# Patient Record
Sex: FEMALE | Race: WHITE | HISPANIC OR LATINO | Employment: FULL TIME | URBAN - METROPOLITAN AREA
[De-identification: names, ages, dates, MRNs, and addresses within clinical notes are randomized per-mention and may not be internally consistent; named-entity substitution may affect disease eponyms.]

---

## 2021-10-15 ENCOUNTER — OFFICE VISIT (OUTPATIENT)
Dept: FAMILY MEDICINE CLINIC | Facility: CLINIC | Age: 26
End: 2021-10-15

## 2021-10-15 ENCOUNTER — APPOINTMENT (OUTPATIENT)
Dept: LAB | Facility: CLINIC | Age: 26
End: 2021-10-15
Payer: COMMERCIAL

## 2021-10-15 VITALS
RESPIRATION RATE: 18 BRPM | HEART RATE: 82 BPM | DIASTOLIC BLOOD PRESSURE: 80 MMHG | OXYGEN SATURATION: 97 % | SYSTOLIC BLOOD PRESSURE: 104 MMHG | BODY MASS INDEX: 28.6 KG/M2 | TEMPERATURE: 98 F | HEIGHT: 64 IN | WEIGHT: 167.5 LBS

## 2021-10-15 DIAGNOSIS — Z83.3 FAMILY HISTORY OF DIABETES MELLITUS: ICD-10-CM

## 2021-10-15 DIAGNOSIS — J01.10 ACUTE NON-RECURRENT FRONTAL SINUSITIS: Primary | ICD-10-CM

## 2021-10-15 DIAGNOSIS — Z11.4 SCREENING FOR HIV (HUMAN IMMUNODEFICIENCY VIRUS): ICD-10-CM

## 2021-10-15 DIAGNOSIS — E66.3 OVERWEIGHT (BMI 25.0-29.9): ICD-10-CM

## 2021-10-15 DIAGNOSIS — Z11.59 NEED FOR HEPATITIS C SCREENING TEST: ICD-10-CM

## 2021-10-15 DIAGNOSIS — Z00.00 ANNUAL PHYSICAL EXAM: ICD-10-CM

## 2021-10-15 DIAGNOSIS — Z23 ENCOUNTER FOR IMMUNIZATION: ICD-10-CM

## 2021-10-15 DIAGNOSIS — Z12.4 SCREENING FOR CERVICAL CANCER: ICD-10-CM

## 2021-10-15 LAB
ALBUMIN SERPL BCP-MCNC: 4.1 G/DL (ref 3.5–5)
ALP SERPL-CCNC: 93 U/L (ref 46–116)
ALT SERPL W P-5'-P-CCNC: 35 U/L (ref 12–78)
ANION GAP SERPL CALCULATED.3IONS-SCNC: 3 MMOL/L (ref 4–13)
AST SERPL W P-5'-P-CCNC: 22 U/L (ref 5–45)
BASOPHILS # BLD AUTO: 0.07 THOUSANDS/ΜL (ref 0–0.1)
BASOPHILS NFR BLD AUTO: 1 % (ref 0–1)
BILIRUB SERPL-MCNC: 0.61 MG/DL (ref 0.2–1)
BUN SERPL-MCNC: 13 MG/DL (ref 5–25)
CALCIUM SERPL-MCNC: 9.4 MG/DL (ref 8.3–10.1)
CHLORIDE SERPL-SCNC: 110 MMOL/L (ref 100–108)
CHOLEST SERPL-MCNC: 152 MG/DL (ref 50–200)
CO2 SERPL-SCNC: 27 MMOL/L (ref 21–32)
CREAT SERPL-MCNC: 0.74 MG/DL (ref 0.6–1.3)
CREAT UR-MCNC: 275 MG/DL
EOSINOPHIL # BLD AUTO: 0.4 THOUSAND/ΜL (ref 0–0.61)
EOSINOPHIL NFR BLD AUTO: 7 % (ref 0–6)
ERYTHROCYTE [DISTWIDTH] IN BLOOD BY AUTOMATED COUNT: 12.4 % (ref 11.6–15.1)
GFR SERPL CREATININE-BSD FRML MDRD: 111 ML/MIN/1.73SQ M
GLUCOSE P FAST SERPL-MCNC: 90 MG/DL (ref 65–99)
HCT VFR BLD AUTO: 41.3 % (ref 34.8–46.1)
HCV AB SER QL: NORMAL
HDLC SERPL-MCNC: 41 MG/DL
HGB BLD-MCNC: 13.7 G/DL (ref 11.5–15.4)
IMM GRANULOCYTES # BLD AUTO: 0.02 THOUSAND/UL (ref 0–0.2)
IMM GRANULOCYTES NFR BLD AUTO: 0 % (ref 0–2)
LDLC SERPL CALC-MCNC: 101 MG/DL (ref 0–100)
LYMPHOCYTES # BLD AUTO: 2.55 THOUSANDS/ΜL (ref 0.6–4.47)
LYMPHOCYTES NFR BLD AUTO: 44 % (ref 14–44)
MCH RBC QN AUTO: 29.5 PG (ref 26.8–34.3)
MCHC RBC AUTO-ENTMCNC: 33.2 G/DL (ref 31.4–37.4)
MCV RBC AUTO: 89 FL (ref 82–98)
MICROALBUMIN UR-MCNC: 25.6 MG/L (ref 0–20)
MICROALBUMIN/CREAT 24H UR: 9 MG/G CREATININE (ref 0–30)
MONOCYTES # BLD AUTO: 0.29 THOUSAND/ΜL (ref 0.17–1.22)
MONOCYTES NFR BLD AUTO: 5 % (ref 4–12)
NEUTROPHILS # BLD AUTO: 2.5 THOUSANDS/ΜL (ref 1.85–7.62)
NEUTS SEG NFR BLD AUTO: 43 % (ref 43–75)
NONHDLC SERPL-MCNC: 111 MG/DL
NRBC BLD AUTO-RTO: 0 /100 WBCS
PLATELET # BLD AUTO: 341 THOUSANDS/UL (ref 149–390)
PMV BLD AUTO: 9.5 FL (ref 8.9–12.7)
POTASSIUM SERPL-SCNC: 4.1 MMOL/L (ref 3.5–5.3)
PROT SERPL-MCNC: 8.1 G/DL (ref 6.4–8.2)
RBC # BLD AUTO: 4.65 MILLION/UL (ref 3.81–5.12)
SODIUM SERPL-SCNC: 140 MMOL/L (ref 136–145)
TRIGL SERPL-MCNC: 50 MG/DL
TSH SERPL DL<=0.05 MIU/L-ACNC: 2.29 UIU/ML (ref 0.36–3.74)
WBC # BLD AUTO: 5.83 THOUSAND/UL (ref 4.31–10.16)

## 2021-10-15 PROCEDURE — 90471 IMMUNIZATION ADMIN: CPT | Performed by: FAMILY MEDICINE

## 2021-10-15 PROCEDURE — 86803 HEPATITIS C AB TEST: CPT

## 2021-10-15 PROCEDURE — 90686 IIV4 VACC NO PRSV 0.5 ML IM: CPT | Performed by: FAMILY MEDICINE

## 2021-10-15 PROCEDURE — 99385 PREV VISIT NEW AGE 18-39: CPT | Performed by: FAMILY MEDICINE

## 2021-10-15 PROCEDURE — 87389 HIV-1 AG W/HIV-1&-2 AB AG IA: CPT

## 2021-10-15 PROCEDURE — 36415 COLL VENOUS BLD VENIPUNCTURE: CPT

## 2021-10-15 PROCEDURE — 80053 COMPREHEN METABOLIC PANEL: CPT

## 2021-10-15 PROCEDURE — 80061 LIPID PANEL: CPT

## 2021-10-15 PROCEDURE — 82570 ASSAY OF URINE CREATININE: CPT

## 2021-10-15 PROCEDURE — 85025 COMPLETE CBC W/AUTO DIFF WBC: CPT

## 2021-10-15 PROCEDURE — 82043 UR ALBUMIN QUANTITATIVE: CPT

## 2021-10-15 PROCEDURE — 84443 ASSAY THYROID STIM HORMONE: CPT

## 2021-10-15 RX ORDER — FLUTICASONE PROPIONATE 50 MCG
1 SPRAY, SUSPENSION (ML) NASAL 2 TIMES DAILY
Qty: 16 G | Refills: 1 | Status: SHIPPED | OUTPATIENT
Start: 2021-10-15

## 2021-10-15 RX ORDER — AMOXICILLIN 500 MG/1
500 CAPSULE ORAL EVERY 8 HOURS SCHEDULED
Qty: 30 CAPSULE | Refills: 0 | Status: SHIPPED | OUTPATIENT
Start: 2021-10-15 | End: 2021-10-25

## 2021-10-20 ENCOUNTER — OFFICE VISIT (OUTPATIENT)
Dept: OBGYN CLINIC | Facility: CLINIC | Age: 26
End: 2021-10-20

## 2021-10-20 VITALS
DIASTOLIC BLOOD PRESSURE: 79 MMHG | BODY MASS INDEX: 29.17 KG/M2 | SYSTOLIC BLOOD PRESSURE: 118 MMHG | WEIGHT: 168.6 LBS | HEART RATE: 74 BPM

## 2021-10-20 DIAGNOSIS — Z97.5 IUD (INTRAUTERINE DEVICE) IN PLACE: Primary | ICD-10-CM

## 2021-10-20 DIAGNOSIS — Z12.4 SCREENING FOR CERVICAL CANCER: ICD-10-CM

## 2021-10-20 LAB — HIV 1+2 AB+HIV1 P24 AG SERPL QL IA: NORMAL

## 2021-10-20 PROCEDURE — 99213 OFFICE O/P EST LOW 20 MIN: CPT | Performed by: OBSTETRICS & GYNECOLOGY

## 2022-04-28 ENCOUNTER — OFFICE VISIT (OUTPATIENT)
Dept: OBGYN CLINIC | Facility: CLINIC | Age: 27
End: 2022-04-28

## 2022-04-28 VITALS
WEIGHT: 164 LBS | HEART RATE: 89 BPM | SYSTOLIC BLOOD PRESSURE: 117 MMHG | DIASTOLIC BLOOD PRESSURE: 77 MMHG | BODY MASS INDEX: 28.37 KG/M2

## 2022-04-28 DIAGNOSIS — Z30.431 IUD CHECK UP: Primary | ICD-10-CM

## 2022-04-28 PROCEDURE — 99213 OFFICE O/P EST LOW 20 MIN: CPT | Performed by: OBSTETRICS & GYNECOLOGY

## 2022-04-28 NOTE — PROGRESS NOTES
OB/GYN VISIT  Rashad Alvarado  4/29/2022  4:22 PM      Assessment/Plan:    Rashad Alvarado is a 31 y o  who presents with milk bleb vs carbuncle  Patient reporting that the lesion is improving spontaneously and there is no evidence of infection at this time  She continues to pump without issue     - Recommend warm compress to area, patient to RTC if lesion worsens  - IUD strings noted in the cervix, however given new onset pain, will order pelvic ultrasound    Subjective:     Rashad Alvarado is a 31 y o  who presents with breast pain and increasing pelvic pain with bloating  She reports that she is breast feeding and reports a lesion on her breast that has been present for approximately 1 week  The pain was so significant last week that she stopped putting her baby to the breast but she has been able to continue pumping  The pain is getting better at this time  She denies fevers/chills  She denies any nipple discharge  Patient also reports that she has been having more bloating, cramping and pelvic pain since before  She has an IUD in place but wants to have a pelvic exam to check it  Objective:    Vitals: Blood pressure 117/77, pulse 89, weight 74 4 kg (164 lb)  Body mass index is 28 37 kg/m²  Past Medical History:   Diagnosis Date    Constipation     History of sinus problem      Past Surgical History:   Procedure Laterality Date    GALLBLADDER SURGERY         Physical Exam  Constitutional:       General: She is not in acute distress  Appearance: She is not ill-appearing or toxic-appearing  HENT:      Head: Normocephalic and atraumatic  Eyes:      Extraocular Movements: Extraocular movements intact  Pupils: Pupils are equal, round, and reactive to light  Chest:          Comments: Small, sub-centimeter lesions noted; no purulent discharge expressed; no erythema or warmth noted  Abdominal:      Palpations: Abdomen is soft  Genitourinary:     General: Normal vulva        Comments: IUD strings visualized through the cervical os  Neurological:      Mental Status: She is alert             Ayan Dennis MD  4/29/2022  4:22 PM

## 2022-05-02 ENCOUNTER — HOSPITAL ENCOUNTER (OUTPATIENT)
Dept: ULTRASOUND IMAGING | Facility: HOSPITAL | Age: 27
Discharge: HOME | End: 2022-05-02
Payer: COMMERCIAL

## 2022-05-02 DIAGNOSIS — Z30.431 IUD CHECK UP: ICD-10-CM

## 2022-05-02 PROCEDURE — 76830 TRANSVAGINAL US NON-OB: CPT

## 2022-05-02 PROCEDURE — 76856 US EXAM PELVIC COMPLETE: CPT

## 2022-05-10 ENCOUNTER — TELEPHONE (OUTPATIENT)
Dept: OBGYN CLINIC | Facility: CLINIC | Age: 27
End: 2022-05-10

## 2022-05-10 NOTE — TELEPHONE ENCOUNTER
Called patient to notified her that IUD is positioned correctly on the ultrasound, lvm asking patient to call office

## 2022-07-21 ENCOUNTER — APPOINTMENT (OUTPATIENT)
Dept: LAB | Facility: HOSPITAL | Age: 27
End: 2022-07-21
Payer: COMMERCIAL

## 2022-07-21 ENCOUNTER — ANNUAL EXAM (OUTPATIENT)
Dept: OBGYN CLINIC | Facility: CLINIC | Age: 27
End: 2022-07-21

## 2022-07-21 VITALS
SYSTOLIC BLOOD PRESSURE: 117 MMHG | BODY MASS INDEX: 28.03 KG/M2 | DIASTOLIC BLOOD PRESSURE: 77 MMHG | HEART RATE: 76 BPM | WEIGHT: 164.2 LBS | HEIGHT: 64 IN

## 2022-07-21 DIAGNOSIS — Z97.5 IUD (INTRAUTERINE DEVICE) IN PLACE: ICD-10-CM

## 2022-07-21 DIAGNOSIS — Z87.42 HISTORY OF ABNORMAL CERVICAL PAP SMEAR: ICD-10-CM

## 2022-07-21 DIAGNOSIS — Z98.890 H/O LEEP: ICD-10-CM

## 2022-07-21 DIAGNOSIS — Z72.51 HIGH RISK HETEROSEXUAL BEHAVIOR: ICD-10-CM

## 2022-07-21 DIAGNOSIS — Z01.419 ENCOUNTER FOR ANNUAL ROUTINE GYNECOLOGICAL EXAMINATION: Primary | ICD-10-CM

## 2022-07-21 LAB
HBV SURFACE AG SER QL: NORMAL
HCV AB SER QL: NORMAL

## 2022-07-21 PROCEDURE — 86803 HEPATITIS C AB TEST: CPT

## 2022-07-21 PROCEDURE — 87491 CHLMYD TRACH DNA AMP PROBE: CPT

## 2022-07-21 PROCEDURE — G0476 HPV COMBO ASSAY CA SCREEN: HCPCS

## 2022-07-21 PROCEDURE — 87591 N.GONORRHOEAE DNA AMP PROB: CPT

## 2022-07-21 PROCEDURE — 87340 HEPATITIS B SURFACE AG IA: CPT

## 2022-07-21 PROCEDURE — G0145 SCR C/V CYTO,THINLAYER,RESCR: HCPCS

## 2022-07-21 PROCEDURE — 99214 OFFICE O/P EST MOD 30 MIN: CPT | Performed by: OBSTETRICS & GYNECOLOGY

## 2022-07-21 PROCEDURE — 36415 COLL VENOUS BLD VENIPUNCTURE: CPT

## 2022-07-21 PROCEDURE — 86592 SYPHILIS TEST NON-TREP QUAL: CPT

## 2022-07-21 PROCEDURE — 87389 HIV-1 AG W/HIV-1&-2 AB AG IA: CPT

## 2022-07-21 NOTE — PROGRESS NOTES
OB/GYN VISIT  Shell Prather  7/21/2022  5:04 PM    Subjective    [facility] Jose Officer is a 28 y o  female who presents for annual well woman exam   Last Pap smear was normal a year ago  Patient states that she had a LEEP procedure done due to abnormal pap 4 years ago  1 year after that she had a normal pap smear, and then last year one more normal pap smear  Patient does not have access to medical records  Patient no longer has periods because she has mirena in place, and has no spotting  Patient requests STI screening  Menstrual History:  OB History    No obstetric history on file  Review of Systems  Denies vaginal discharge, labial erythema or lesions, dyspareunia  Sexually active: yes - with 1 male partner  Current contraception: IUD  History of abnormal Pap smear: yes - 4 years ago, had a LEEP  Family history of breast, endometrial, uterine or ovarian cancer: no  Gardasil vaccine: yes    COVID Vaccine: yes    Health Maintenance:    She feels safe at home  She does not use tobacco  She does not use other drugs     Objective:  /77   Pulse 76   Ht 5' 4" (1 626 m)   Wt 74 5 kg (164 lb 3 2 oz)   BMI 28 18 kg/m²  Body mass index is 28 18 kg/m²  Physical Exam:  GEN: The patient was alert and oriented x3, pleasant well-appearing female in no acute distress  HEENT:  Unremarkable, no anterior or posterior lymphadenopathy, no thyromegaly  CV:  Regular rate   RESP:  Unlabored breathing  BREAST:  Symmetric breasts with no palpable breast masses or obvious breast lesions  She has no retractions or nipple discharge  She has no axillary abnormalities or palpable masses  GI:  Soft, nontender, non-distended  MSK: bilateral lower extremities are nontender, no edema  : Normal appearing external female genitalia, normal appearing urethral meatus  On sterile speculum exam,  normal appearing vaginal epithelium, no vaginal discharge, no bleeding, grossly normal appearing cervix   On bimanual exam,  no cervical motion tenderness; uterus is smooth, mobile, nontender  No tenderness or fullness in the bilateral adnexa  ASSESSMENT/PLAN: Fern Blevinst is a 28 y o  No obstetric history on file  who presents for annual gynecologic exam     21-29  1  Routine well woman exam done today  2   Pap and HPV:Pap with HPV was done today  Current ASCCP Guidelines reviewed  3   The patient accepted STD testing  chlamydia, gonorrhea, HIV and HPV testing performed  Safe sex practices have been discussed  4  The patient is sexually active  She has mirena and options have been discussed  5  The following were reviewed in today's visit: STD testing and HIV risk factors and prevention    6  Patient to return to office in 12 months for annual exam      D/w Dr Paloma Mann MD  PGY-1

## 2022-07-22 LAB
C TRACH DNA SPEC QL NAA+PROBE: NEGATIVE
HIV 1+2 AB+HIV1 P24 AG SERPL QL IA: NORMAL
HPV HR 12 DNA CVX QL NAA+PROBE: NEGATIVE
HPV16 DNA CVX QL NAA+PROBE: NEGATIVE
HPV18 DNA CVX QL NAA+PROBE: NEGATIVE
N GONORRHOEA DNA SPEC QL NAA+PROBE: NEGATIVE
RPR SER QL: NORMAL

## 2022-07-27 LAB
LAB AP GYN PRIMARY INTERPRETATION: NORMAL
Lab: NORMAL
PATH INTERP SPEC-IMP: NORMAL

## 2022-10-12 PROBLEM — J01.10 ACUTE NON-RECURRENT FRONTAL SINUSITIS: Status: RESOLVED | Noted: 2021-10-15 | Resolved: 2022-10-12

## 2022-12-28 ENCOUNTER — OFFICE VISIT (OUTPATIENT)
Dept: FAMILY MEDICINE CLINIC | Facility: CLINIC | Age: 27
End: 2022-12-28

## 2022-12-28 VITALS
HEART RATE: 93 BPM | DIASTOLIC BLOOD PRESSURE: 70 MMHG | BODY MASS INDEX: 29.19 KG/M2 | HEIGHT: 64 IN | OXYGEN SATURATION: 98 % | TEMPERATURE: 97.8 F | SYSTOLIC BLOOD PRESSURE: 118 MMHG | WEIGHT: 171 LBS | RESPIRATION RATE: 16 BRPM

## 2022-12-28 DIAGNOSIS — R23.4 CRACKED SKIN ON FEET: Primary | ICD-10-CM

## 2022-12-28 DIAGNOSIS — L84 CALLUS OF FOOT: ICD-10-CM

## 2022-12-28 RX ORDER — AMMONIUM LACTATE 12 G/100G
CREAM TOPICAL 2 TIMES DAILY
Qty: 385 G | Refills: 0 | Status: SHIPPED | OUTPATIENT
Start: 2022-12-28 | End: 2023-01-10

## 2022-12-28 NOTE — PROGRESS NOTES
Name: Essence Cortez      : 1995      MRN: [id number]  Encounter Provider: TAISHA Lau  Encounter Date: 2022   Encounter department: [facility]     1  Cracked skin on feet  Assessment & Plan:  Severely calloused, dry, cracked skin on feet bilaterally without signs of infection  - Ammonium lactate cream BID  - Referral to Podiatry Dr Chandana Sauceda  Orders:  -     Ambulatory Referral to Podiatry; Future  -     ammonium lactate (LAC-HYDRIN) 12 % cream; Apply topically 2 (two) times a day    2  Callus of foot  -     Ambulatory Referral to Podiatry; Future  -     ammonium lactate (LAC-HYDRIN) 12 % cream; Apply topically 2 (two) times a day         Subjective     HPI     Dayri presented to the office for a SAME DAY appt for pain in both feet x2 months due to cracking at the heels and calloused soles  Pt denies any open areas or bleeding, denies any other symptoms  Pt states this is a new problem  She does not have skin problems elsewhere on her body  Review of Systems   Constitutional: Negative for chills, fatigue, fever and unexpected weight change  Respiratory: Negative  Cardiovascular: Negative  Negative for leg swelling  Gastrointestinal: Negative  Musculoskeletal: Positive for gait problem (Pain with walking  )  All other systems reviewed and are negative        Past Medical History:   Diagnosis Date   • Constipation    • History of sinus problem      Past Surgical History:   Procedure Laterality Date   • GALLBLADDER SURGERY       Family History   Problem Relation Age of Onset   • Hypertension Mother    • No Known Problems Father      Social History     Socioeconomic History   • Marital status: /Civil Union     Spouse name: None   • Number of children: None   • Years of education: None   • Highest education level: None   Occupational History   • None   Tobacco Use   • Smoking status: Never   • Smokeless tobacco: Never   Substance and Sexual Activity   • Alcohol use: Never   • Drug use: Never   • Sexual activity: Yes     Partners: Male     Birth control/protection: I U D  Other Topics Concern   • None   Social History Narrative   • None     Social Determinants of Health     Financial Resource Strain: Low Risk    • Difficulty of Paying Living Expenses: Not hard at all   Food Insecurity: No Food Insecurity   • Worried About Running Out of Food in the Last Year: Never true   • Ran Out of Food in the Last Year: Never true   Transportation Needs: No Transportation Needs   • Lack of Transportation (Medical): No   • Lack of Transportation (Non-Medical): No   Physical Activity: Not on file   Stress: Not on file   Social Connections: Not on file   Intimate Partner Violence: Not on file   Housing Stability: Not on file     Current Outpatient Medications on File Prior to Visit   Medication Sig   • fluticasone (FLONASE) 50 mcg/act nasal spray 1 spray into each nostril 2 (two) times a day     No Known Allergies  Immunization History   Administered Date(s) Administered   • COVID-19 PFIZER VACCINE 0 3 ML IM 05/24/2021, 06/14/2021   • Influenza, injectable, quadrivalent, preservative free 0 5 mL 10/15/2021       Objective     /70 (BP Location: Right arm, Patient Position: Sitting, Cuff Size: Standard)   Pulse 93   Temp 97 8 °F (36 6 °C) (Temporal)   Resp 16   Ht 5' 4" (1 626 m)   Wt 77 6 kg (171 lb)   SpO2 98%   Breastfeeding No   BMI 29 35 kg/m²     Physical Exam  Vitals reviewed  Constitutional:       General: She is not in acute distress  Appearance: She is overweight  She is not ill-appearing or diaphoretic  Cardiovascular:      Rate and Rhythm: Normal rate and regular rhythm  Pulses:           Dorsalis pedis pulses are 2+ on the right side and 2+ on the left side  Posterior tibial pulses are 2+ on the right side and 2+ on the left side  Heart sounds: Normal heart sounds   No murmur heard   Pulmonary:      Effort: Pulmonary effort is normal  No tachypnea  Breath sounds: Normal breath sounds  No decreased breath sounds or wheezing  Musculoskeletal:      Right lower leg: No edema  Left lower leg: No edema  Right foot: Normal range of motion  No deformity or bunion  Left foot: Normal range of motion  No deformity or bunion  Feet:      Right foot:      Protective Sensation: 10 sites tested  10 sites sensed  Skin integrity: Callus, dry skin and fissure present  No ulcer, skin breakdown, erythema or warmth  Toenail Condition: Right toenails are normal       Left foot:      Protective Sensation: 10 sites tested  10 sites sensed  Skin integrity: Callus, dry skin and fissure present  No ulcer, skin breakdown, erythema or warmth  Toenail Condition: Left toenails are normal    Skin:     General: Skin is warm and dry  Neurological:      Mental Status: She is alert and oriented to person, place, and time     Psychiatric:         Mood and Affect: Mood and affect normal                  TAISHA Mendiola

## 2023-01-01 NOTE — ASSESSMENT & PLAN NOTE
Severely calloused, dry, cracked skin on feet bilaterally without signs of infection  - Ammonium lactate cream BID  - Referral to Podiatry Dr Jacqueline Hall

## 2023-01-10 ENCOUNTER — OFFICE VISIT (OUTPATIENT)
Dept: FAMILY MEDICINE CLINIC | Facility: CLINIC | Age: 28
End: 2023-01-10

## 2023-01-10 VITALS
BODY MASS INDEX: 29.02 KG/M2 | OXYGEN SATURATION: 98 % | DIASTOLIC BLOOD PRESSURE: 70 MMHG | HEIGHT: 64 IN | HEART RATE: 93 BPM | SYSTOLIC BLOOD PRESSURE: 130 MMHG | RESPIRATION RATE: 16 BRPM | TEMPERATURE: 98.7 F | WEIGHT: 170 LBS

## 2023-01-10 DIAGNOSIS — L30.9 DERMATITIS: Primary | ICD-10-CM

## 2023-01-10 PROBLEM — Z87.42 HISTORY OF ABNORMAL CERVICAL PAP SMEAR: Status: RESOLVED | Noted: 2022-07-21 | Resolved: 2023-01-10

## 2023-01-10 RX ORDER — DIAPER,BRIEF,INFANT-TODD,DISP
EACH MISCELLANEOUS 2 TIMES DAILY
Qty: 453.6 G | Refills: 0 | Status: SHIPPED | OUTPATIENT
Start: 2023-01-10

## 2023-01-10 NOTE — PROGRESS NOTES
Name: Prem Sorto      : 1995      MRN: [id number]  Encounter Provider: [address]  Encounter Date: 1/10/2023   Encounter department: [address] [place] [address]  Dermatitis  Assessment & Plan:  Discussed elimination of exacerbating factors, maintaining skin hydration with emollients & moisturizers, & bathing practices  Orders:  -     hydrocortisone 1 % cream; Apply topically 2 (two) times a day    BMI Counseling: There is no height or weight on file to calculate BMI  The BMI is above normal  Nutrition recommendations include decreasing portion sizes, encouraging healthy choices of fruits and vegetables, decreasing fast food intake, consuming healthier snacks, limiting drinks that contain sugar, moderation in carbohydrate intake, increasing intake of lean protein, reducing intake of saturated and trans fat and reducing intake of cholesterol  Exercise recommendations include moderate physical activity 150 minutes/week  No pharmacotherapy was ordered  Rationale for BMI follow-up plan is due to patient being overweight or obese  Subjective      Rash  Patient presents for evaluation of a rash involving the chest and back  Rash started 1 weeks ago  Lesions are red, and raised in texture  Rash has not changed over time  Rash is pruritic  Associated symptoms: none  Patient has not had contacts with similar rash  Patient has had new exposures  Rash occurred after trying a new dove soap            Review of Systems   Constitutional: Negative for chills and fever  HENT: Negative for ear pain and sore throat  Eyes: Negative for pain and visual disturbance  Respiratory: Negative for cough and shortness of breath  Cardiovascular: Negative for chest pain and palpitations  Gastrointestinal: Negative for abdominal pain and vomiting  Genitourinary: Negative for dysuria and hematuria     Musculoskeletal: Negative for arthralgias and back pain  Skin: Positive for rash  Negative for color change  Neurological: Negative for seizures and syncope  All other systems reviewed and are negative  Current Outpatient Medications on File Prior to Visit   Medication Sig   • [DISCONTINUED] ammonium lactate (LAC-HYDRIN) 12 % cream Apply topically 2 (two) times a day   • [DISCONTINUED] fluticasone (FLONASE) 50 mcg/act nasal spray 1 spray into each nostril 2 (two) times a day       Objective     /70 (BP Location: Right arm, Patient Position: Sitting, Cuff Size: Standard)   Pulse 93   Temp 98 7 °F (37 1 °C) (Temporal)   Resp 16   Ht 5' 4" (1 626 m)   Wt 77 1 kg (170 lb)   SpO2 98%   Breastfeeding No   BMI 29 18 kg/m²     Physical Exam  Vitals and nursing note reviewed  HENT:      Head: Normocephalic and atraumatic  Right Ear: External ear normal       Left Ear: External ear normal       Nose: Nose normal    Eyes:      Extraocular Movements: Extraocular movements intact  Conjunctiva/sclera: Conjunctivae normal       Pupils: Pupils are equal, round, and reactive to light  Cardiovascular:      Rate and Rhythm: Normal rate and regular rhythm  Pulses: Normal pulses  Pulmonary:      Effort: Pulmonary effort is normal    Abdominal:      General: Abdomen is flat  Palpations: Abdomen is soft  Tenderness: There is no abdominal tenderness  Musculoskeletal:         General: Normal range of motion  Cervical back: Normal range of motion  Skin:     General: Skin is warm and dry  Findings: Erythema and rash present  Rash is urticarial       Comments: Multiple small (< 1 cm) slightly crusted spots noted on upper chest and back  Neurological:      General: No focal deficit present  Mental Status: She is alert and oriented to person, place, and time  Mental status is at baseline  Psychiatric:         Mood and Affect: Mood normal          Behavior: Behavior normal          Thought Content:  Thought content normal          Judgment: Judgment normal        [address] [place] [address]

## 2023-01-10 NOTE — ASSESSMENT & PLAN NOTE
Discussed elimination of exacerbating factors, maintaining skin hydration with emollients & moisturizers, & bathing practices

## 2023-01-25 ENCOUNTER — OFFICE VISIT (OUTPATIENT)
Dept: FAMILY MEDICINE CLINIC | Facility: CLINIC | Age: 28
End: 2023-01-25

## 2023-01-25 VITALS
DIASTOLIC BLOOD PRESSURE: 70 MMHG | WEIGHT: 166 LBS | TEMPERATURE: 98.7 F | SYSTOLIC BLOOD PRESSURE: 104 MMHG | BODY MASS INDEX: 28.49 KG/M2 | OXYGEN SATURATION: 98 % | RESPIRATION RATE: 19 BRPM | HEART RATE: 81 BPM

## 2023-01-25 DIAGNOSIS — M54.50 ACUTE LOW BACK PAIN WITHOUT SCIATICA, UNSPECIFIED BACK PAIN LATERALITY: Primary | ICD-10-CM

## 2023-01-25 RX ORDER — MELOXICAM 7.5 MG/1
7.5 TABLET ORAL DAILY
Qty: 30 TABLET | Refills: 0 | Status: SHIPPED | OUTPATIENT
Start: 2023-01-25

## 2023-01-25 NOTE — PROGRESS NOTES
Name: Kelsi Casper      : 1995      MRN: [id number]  Encounter Provider: Rebecca Kussmaul, MD  Encounter Date: 2023   Encounter department: [address] [place] [address]     1  Acute low back pain without sciatica, unspecified back pain laterality  -     meloxicam (Mobic) 7 5 mg tablet; Take 1 tablet (7 5 mg total) by mouth daily  -     Ambulatory Referral to Physical Therapy; Future         Subjective      Back Pain  This is a chronic problem  The current episode started more than 1 month ago  The problem occurs constantly  The problem has been gradually improving since onset  The pain is present in the lumbar spine  The quality of the pain is described as aching  The pain does not radiate  The pain is moderate  The symptoms are aggravated by bending and standing  Pertinent negatives include no abdominal pain, bladder incontinence, bowel incontinence, chest pain, dysuria, fever, headaches, leg pain, numbness, paresis, paresthesias, pelvic pain, perianal numbness, tingling, weakness or weight loss  She has tried nothing for the symptoms  Review of Systems   Constitutional: Negative for fever and weight loss  Cardiovascular: Negative for chest pain  Gastrointestinal: Negative for abdominal pain and bowel incontinence  Genitourinary: Negative for bladder incontinence, dysuria and pelvic pain  Musculoskeletal: Positive for back pain  Neurological: Negative for tingling, weakness, numbness, headaches and paresthesias  All other systems reviewed and are negative        Current Outpatient Medications on File Prior to Visit   Medication Sig   • hydrocortisone 1 % cream Apply topically 2 (two) times a day       Objective     /70 (BP Location: Left arm, Patient Position: Sitting, Cuff Size: Standard)   Pulse 81   Temp 98 7 °F (37 1 °C) (Temporal)   Resp 19   Wt 75 3 kg (166 lb)   SpO2 98%   BMI 28 49 kg/m²     Physical Exam  Vitals and nursing note reviewed  Constitutional:       Appearance: She is well-developed  HENT:      Head: Normocephalic  Right Ear: External ear normal       Left Ear: External ear normal       Nose: Nose normal    Eyes:      Conjunctiva/sclera: Conjunctivae normal       Pupils: Pupils are equal, round, and reactive to light  Neck:      Thyroid: No thyromegaly  Cardiovascular:      Rate and Rhythm: Normal rate and regular rhythm  Heart sounds: Normal heart sounds  Pulmonary:      Effort: Pulmonary effort is normal       Breath sounds: Normal breath sounds  Abdominal:      Palpations: Abdomen is soft  Tenderness: There is no abdominal tenderness  There is no guarding or rebound  Musculoskeletal:         General: Tenderness present  Normal range of motion  Cervical back: Normal, normal range of motion and neck supple  Thoracic back: Spasms and tenderness present  Lumbar back: Spasms and tenderness present  Skin:     General: Skin is dry  Neurological:      Mental Status: She is alert and oriented to person, place, and time  Deep Tendon Reflexes: Reflexes are normal and symmetric         Yasmin MD Moise

## 2023-02-18 DIAGNOSIS — M54.50 ACUTE LOW BACK PAIN WITHOUT SCIATICA, UNSPECIFIED BACK PAIN LATERALITY: ICD-10-CM

## 2023-02-18 DIAGNOSIS — L30.9 DERMATITIS: ICD-10-CM

## 2023-02-18 RX ORDER — MELOXICAM 7.5 MG/1
TABLET ORAL
Qty: 30 TABLET | Refills: 0 | Status: SHIPPED | OUTPATIENT
Start: 2023-02-18

## 2023-02-18 RX ORDER — DIAPER,BRIEF,INFANT-TODD,DISP
EACH MISCELLANEOUS
Qty: 453.6 G | Refills: 0 | Status: SHIPPED | OUTPATIENT
Start: 2023-02-18

## 2023-03-22 DIAGNOSIS — M54.50 ACUTE LOW BACK PAIN WITHOUT SCIATICA, UNSPECIFIED BACK PAIN LATERALITY: ICD-10-CM

## 2023-03-23 RX ORDER — MELOXICAM 7.5 MG/1
TABLET ORAL
Qty: 30 TABLET | Refills: 0 | Status: SHIPPED | OUTPATIENT
Start: 2023-03-23